# Patient Record
Sex: MALE | Race: WHITE | Employment: UNEMPLOYED | URBAN - METROPOLITAN AREA
[De-identification: names, ages, dates, MRNs, and addresses within clinical notes are randomized per-mention and may not be internally consistent; named-entity substitution may affect disease eponyms.]

---

## 2022-08-31 ENCOUNTER — APPOINTMENT (OUTPATIENT)
Dept: GENERAL RADIOLOGY | Age: 52
End: 2022-08-31
Payer: COMMERCIAL

## 2022-08-31 ENCOUNTER — HOSPITAL ENCOUNTER (EMERGENCY)
Age: 52
Discharge: HOME | End: 2022-08-31
Attending: EMERGENCY MEDICINE
Payer: COMMERCIAL

## 2022-08-31 VITALS
TEMPERATURE: 98.4 F | SYSTOLIC BLOOD PRESSURE: 150 MMHG | HEIGHT: 74 IN | HEART RATE: 78 BPM | WEIGHT: 218 LBS | DIASTOLIC BLOOD PRESSURE: 92 MMHG | BODY MASS INDEX: 27.98 KG/M2 | RESPIRATION RATE: 16 BRPM | OXYGEN SATURATION: 98 %

## 2022-08-31 DIAGNOSIS — Z01.89 ENCOUNTER FOR CARDIOVERSION PROCEDURE: ICD-10-CM

## 2022-08-31 DIAGNOSIS — I48.91 ATRIAL FIBRILLATION WITH RAPID VENTRICULAR RESPONSE (HCC): Primary | ICD-10-CM

## 2022-08-31 LAB
ALBUMIN SERPL-MCNC: 4.1 G/DL (ref 3.5–5.2)
ALP BLD-CCNC: 85 U/L (ref 40–129)
ALT SERPL-CCNC: 19 U/L (ref 0–40)
ANION GAP SERPL CALCULATED.3IONS-SCNC: 9 MMOL/L (ref 7–16)
AST SERPL-CCNC: 26 U/L (ref 0–39)
BASOPHILS ABSOLUTE: 0.07 E9/L (ref 0–0.2)
BASOPHILS RELATIVE PERCENT: 0.7 % (ref 0–2)
BILIRUB SERPL-MCNC: 0.4 MG/DL (ref 0–1.2)
BUN BLDV-MCNC: 10 MG/DL (ref 6–20)
CALCIUM SERPL-MCNC: 8.9 MG/DL (ref 8.6–10.2)
CHLORIDE BLD-SCNC: 106 MMOL/L (ref 98–107)
CO2: 23 MMOL/L (ref 22–29)
CREAT SERPL-MCNC: 0.9 MG/DL (ref 0.7–1.2)
EKG ATRIAL RATE: 234 BPM
EKG Q-T INTERVAL: 366 MS
EKG QRS DURATION: 88 MS
EKG QTC CALCULATION (BAZETT): 467 MS
EKG R AXIS: 24 DEGREES
EKG T AXIS: 41 DEGREES
EKG VENTRICULAR RATE: 98 BPM
EOSINOPHILS ABSOLUTE: 0.03 E9/L (ref 0.05–0.5)
EOSINOPHILS RELATIVE PERCENT: 0.3 % (ref 0–6)
GFR AFRICAN AMERICAN: >60
GFR NON-AFRICAN AMERICAN: >60 ML/MIN/1.73
GLUCOSE BLD-MCNC: 121 MG/DL (ref 74–99)
HCT VFR BLD CALC: 45.7 % (ref 37–54)
HEMOGLOBIN: 16.1 G/DL (ref 12.5–16.5)
IMMATURE GRANULOCYTES #: 0.03 E9/L
IMMATURE GRANULOCYTES %: 0.3 % (ref 0–5)
INR BLD: 1.2
LYMPHOCYTES ABSOLUTE: 0.77 E9/L (ref 1.5–4)
LYMPHOCYTES RELATIVE PERCENT: 8.1 % (ref 20–42)
MCH RBC QN AUTO: 36.3 PG (ref 26–35)
MCHC RBC AUTO-ENTMCNC: 35.2 % (ref 32–34.5)
MCV RBC AUTO: 102.9 FL (ref 80–99.9)
MONOCYTES ABSOLUTE: 0.4 E9/L (ref 0.1–0.95)
MONOCYTES RELATIVE PERCENT: 4.2 % (ref 2–12)
NEUTROPHILS ABSOLUTE: 8.15 E9/L (ref 1.8–7.3)
NEUTROPHILS RELATIVE PERCENT: 86.4 % (ref 43–80)
PDW BLD-RTO: 11.9 FL (ref 11.5–15)
PLATELET # BLD: 272 E9/L (ref 130–450)
PMV BLD AUTO: 8.9 FL (ref 7–12)
POTASSIUM REFLEX MAGNESIUM: 4.7 MMOL/L (ref 3.5–5)
PRO-BNP: 158 PG/ML (ref 0–125)
PROTHROMBIN TIME: 13 SEC (ref 9.3–12.4)
RBC # BLD: 4.44 E12/L (ref 3.8–5.8)
SODIUM BLD-SCNC: 138 MMOL/L (ref 132–146)
TOTAL PROTEIN: 7.2 G/DL (ref 6.4–8.3)
TROPONIN, HIGH SENSITIVITY: 8 NG/L (ref 0–11)
TSH SERPL DL<=0.05 MIU/L-ACNC: 2.49 UIU/ML (ref 0.27–4.2)
WBC # BLD: 9.5 E9/L (ref 4.5–11.5)

## 2022-08-31 PROCEDURE — 99204 OFFICE O/P NEW MOD 45 MIN: CPT | Performed by: INTERNAL MEDICINE

## 2022-08-31 PROCEDURE — 83880 ASSAY OF NATRIURETIC PEPTIDE: CPT

## 2022-08-31 PROCEDURE — 80053 COMPREHEN METABOLIC PANEL: CPT

## 2022-08-31 PROCEDURE — 96375 TX/PRO/DX INJ NEW DRUG ADDON: CPT

## 2022-08-31 PROCEDURE — 96376 TX/PRO/DX INJ SAME DRUG ADON: CPT

## 2022-08-31 PROCEDURE — 96374 THER/PROPH/DIAG INJ IV PUSH: CPT

## 2022-08-31 PROCEDURE — 2500000003 HC RX 250 WO HCPCS

## 2022-08-31 PROCEDURE — 2500000003 HC RX 250 WO HCPCS: Performed by: EMERGENCY MEDICINE

## 2022-08-31 PROCEDURE — 84443 ASSAY THYROID STIM HORMONE: CPT

## 2022-08-31 PROCEDURE — 93005 ELECTROCARDIOGRAM TRACING: CPT | Performed by: EMERGENCY MEDICINE

## 2022-08-31 PROCEDURE — 85025 COMPLETE CBC W/AUTO DIFF WBC: CPT

## 2022-08-31 PROCEDURE — 6360000002 HC RX W HCPCS: Performed by: EMERGENCY MEDICINE

## 2022-08-31 PROCEDURE — 85610 PROTHROMBIN TIME: CPT

## 2022-08-31 PROCEDURE — 84484 ASSAY OF TROPONIN QUANT: CPT

## 2022-08-31 PROCEDURE — 71045 X-RAY EXAM CHEST 1 VIEW: CPT

## 2022-08-31 PROCEDURE — 99285 EMERGENCY DEPT VISIT HI MDM: CPT

## 2022-08-31 PROCEDURE — APPSS60 APP SPLIT SHARED TIME 46-60 MINUTES

## 2022-08-31 RX ORDER — SODIUM CHLORIDE 0.9 % (FLUSH) 0.9 %
SYRINGE (ML) INJECTION
Status: DISCONTINUED
Start: 2022-08-31 | End: 2022-08-31 | Stop reason: HOSPADM

## 2022-08-31 RX ORDER — LOSARTAN POTASSIUM 50 MG/1
50 TABLET ORAL DAILY
COMMUNITY

## 2022-08-31 RX ORDER — DILTIAZEM HYDROCHLORIDE 5 MG/ML
20 INJECTION INTRAVENOUS ONCE
Status: COMPLETED | OUTPATIENT
Start: 2022-08-31 | End: 2022-08-31

## 2022-08-31 RX ORDER — DILTIAZEM HYDROCHLORIDE 5 MG/ML
INJECTION INTRAVENOUS
Status: COMPLETED
Start: 2022-08-31 | End: 2022-08-31

## 2022-08-31 RX ORDER — ETOMIDATE 2 MG/ML
8 INJECTION INTRAVENOUS ONCE
Status: COMPLETED | OUTPATIENT
Start: 2022-08-31 | End: 2022-08-31

## 2022-08-31 RX ORDER — ETOMIDATE 2 MG/ML
10 INJECTION INTRAVENOUS ONCE
Status: COMPLETED | OUTPATIENT
Start: 2022-08-31 | End: 2022-08-31

## 2022-08-31 RX ORDER — ADENOSINE 3 MG/ML
INJECTION, SOLUTION INTRAVENOUS
Status: COMPLETED
Start: 2022-08-31 | End: 2022-08-31

## 2022-08-31 RX ORDER — FENTANYL CITRATE 50 UG/ML
50 INJECTION, SOLUTION INTRAMUSCULAR; INTRAVENOUS ONCE
Status: COMPLETED | OUTPATIENT
Start: 2022-08-31 | End: 2022-08-31

## 2022-08-31 RX ORDER — METOPROLOL SUCCINATE 50 MG/1
50 TABLET, EXTENDED RELEASE ORAL 2 TIMES DAILY
COMMUNITY

## 2022-08-31 RX ORDER — MAGNESIUM SULFATE IN WATER 40 MG/ML
2000 INJECTION, SOLUTION INTRAVENOUS ONCE
Status: DISCONTINUED | OUTPATIENT
Start: 2022-08-31 | End: 2022-08-31

## 2022-08-31 RX ADMIN — DILTIAZEM HYDROCHLORIDE 20 MG: 5 INJECTION INTRAVENOUS at 14:46

## 2022-08-31 RX ADMIN — ETOMIDATE 8 MG: 2 INJECTION, SOLUTION INTRAVENOUS at 15:55

## 2022-08-31 RX ADMIN — ETOMIDATE 10 MG: 2 INJECTION, SOLUTION INTRAVENOUS at 15:47

## 2022-08-31 RX ADMIN — FENTANYL CITRATE 50 MCG: 50 INJECTION, SOLUTION INTRAMUSCULAR; INTRAVENOUS at 15:48

## 2022-08-31 ASSESSMENT — LIFESTYLE VARIABLES
HOW MANY STANDARD DRINKS CONTAINING ALCOHOL DO YOU HAVE ON A TYPICAL DAY: PATIENT DOES NOT DRINK
HOW OFTEN DO YOU HAVE A DRINK CONTAINING ALCOHOL: NEVER

## 2022-08-31 NOTE — ED PROVIDER NOTES
Alexus Cardozo is a 46 y.o. male presenting to the ED for afib, beginning pta ago. The complaint has been intermittent, moderate in severity, and worsened by nothing. 47 yo m who has chronic afib hasnt seen cardiology in several years is on eliquis and metoprolol has no pcp, has been living here for two years but travels out of town a lot for work. Pt was having his wisdowm teeth pulled and pt found to have afib rvr was given adenosine by dentist for hr of 200. Pt states is a social drinking on weekends. Review of Systems:   Pertinent positives and negatives are stated within HPI, all other systems reviewed and are negative.          --------------------------------------------- PAST HISTORY ---------------------------------------------  Past Medical History:  has a past medical history of Atrial fibrillation ([facility] [address] and Hypertension. Past Surgical History:  has a past surgical history that includes Foot surgery. Social History:  reports that he has never smoked. He has never been exposed to tobacco smoke. He has never used smokeless tobacco. He reports current alcohol use. He reports that he does not currently use drugs. Family History: family history includes High Blood Pressure in his father. The patients home medications have been reviewed. Allergies: Patient has no known allergies.     -------------------------------------------------- RESULTS -------------------------------------------------  All laboratory and radiology results have been personally reviewed by myself   LABS:  Results for orders placed or performed during the hospital encounter of 08/31/22   CBC with Auto Differential   Result Value Ref Range    WBC 9.5 4.5 - 11.5 E9/L    RBC 4.44 3.80 - 5.80 E12/L    Hemoglobin 16.1 12.5 - 16.5 g/dL    Hematocrit 45.7 37.0 - 54.0 %    .9 (H) 80.0 - 99.9 fL    MCH 36.3 (H) 26.0 - 35.0 pg    MCHC 35.2 (H) 32.0 - 34.5 %    RDW 11.9 11.5 - 15.0 fL    Platelets 661 099 - 437 E9/L MPV 8.9 7.0 - 12.0 fL    Neutrophils % 86.4 (H) 43.0 - 80.0 %    Immature Granulocytes % 0.3 0.0 - 5.0 %    Lymphocytes % 8.1 (L) 20.0 - 42.0 %    Monocytes % 4.2 2.0 - 12.0 %    Eosinophils % 0.3 0.0 - 6.0 %    Basophils % 0.7 0.0 - 2.0 %    Neutrophils Absolute 8.15 (H) 1.80 - 7.30 E9/L    Immature Granulocytes # 0.03 E9/L    Lymphocytes Absolute 0.77 (L) 1.50 - 4.00 E9/L    Monocytes Absolute 0.40 0.10 - 0.95 E9/L    Eosinophils Absolute 0.03 (L) 0.05 - 0.50 E9/L    Basophils Absolute 0.07 0.00 - 0.20 E9/L   Comprehensive Metabolic Panel w/ Reflex to MG   Result Value Ref Range    Sodium 138 132 - 146 mmol/L    Potassium reflex Magnesium 4.7 3.5 - 5.0 mmol/L    Chloride 106 98 - 107 mmol/L    CO2 23 22 - 29 mmol/L    Anion Gap 9 7 - 16 mmol/L    Glucose 121 (H) 74 - 99 mg/dL    BUN 10 6 - 20 mg/dL    Creatinine 0.9 0.7 - 1.2 mg/dL    GFR Non-African American >60 >=60 mL/min/1.73    GFR African American >60     Calcium 8.9 8.6 - 10.2 mg/dL    Total Protein 7.2 6.4 - 8.3 g/dL    Albumin 4.1 3.5 - 5.2 g/dL    Total Bilirubin 0.4 0.0 - 1.2 mg/dL    Alkaline Phosphatase 85 40 - 129 U/L    ALT 19 0 - 40 U/L    AST 26 0 - 39 U/L   Troponin   Result Value Ref Range    Troponin, High Sensitivity 8 0 - 11 ng/L   Brain Natriuretic Peptide   Result Value Ref Range    Pro- (H) 0 - 125 pg/mL   Protime-INR   Result Value Ref Range    Protime 13.0 (H) 9.3 - 12.4 sec    INR 1.2    TSH   Result Value Ref Range    TSH 2.490 0.270 - 4.200 uIU/mL   EKG 12 Lead   Result Value Ref Range    Ventricular Rate 102 BPM    Atrial Rate 47 BPM    QRS Duration 96 ms    Q-T Interval 348 ms    QTc Calculation (Bazett) 453 ms    R Axis 33 degrees    T Axis 43 degrees   EKG 12 Lead   Result Value Ref Range    Ventricular Rate 148 BPM    Atrial Rate 125 BPM    QRS Duration 94 ms    Q-T Interval 316 ms    QTc Calculation (Bazett) 496 ms    R Axis 31 degrees    T Axis 6 degrees       RADIOLOGY:  Interpreted by Radiologist.  XR CHEST PORTABLE Final Result   No acute process. ------------------------- NURSING NOTES AND VITALS REVIEWED ---------------------------   The nursing notes within the ED encounter and vital signs as below have been reviewed. BP (!) 150/92   Pulse 78   Temp 98.4 °F (36.9 °C) (Oral)   Resp 16   Ht 6' 2\" (1.88 m)   Wt 218 lb (98.9 kg)   SpO2 98%   BMI 27.99 kg/m²   Oxygen Saturation Interpretation: Normal      ---------------------------------------------------PHYSICAL EXAM--------------------------------------  Physical Exam  Constitutional:       General: He is not in acute distress. Appearance: Normal appearance. He is normal weight. He is not ill-appearing, toxic-appearing or diaphoretic. HENT:      Head: Normocephalic and atraumatic. Right Ear: External ear normal.      Left Ear: External ear normal.      Nose: Nose normal.      Mouth/Throat:      Mouth: Mucous membranes are moist.      Pharynx: Oropharynx is clear. Eyes:      Extraocular Movements: Extraocular movements intact. Pupils: Pupils are equal, round, and reactive to light. Cardiovascular:      Rate and Rhythm: Tachycardia present. Rhythm irregular. Pulses: Normal pulses. Heart sounds: Normal heart sounds. Pulmonary:      Effort: Pulmonary effort is normal.      Breath sounds: Normal breath sounds. Abdominal:      General: There is no distension. Palpations: Abdomen is soft. Tenderness: There is no abdominal tenderness. Musculoskeletal:         General: No swelling. Normal range of motion. Cervical back: Normal range of motion and neck supple. Right lower leg: No edema. Left lower leg: No edema. Skin:     General: Skin is warm and dry. Capillary Refill: Capillary refill takes less than 2 seconds. Neurological:      General: No focal deficit present. Mental Status: He is alert and oriented to person, place, and time. Cranial Nerves: No cranial nerve deficit. Sensory: No sensory deficit. Motor: No weakness. Coordination: Coordination normal.   Psychiatric:         Mood and Affect: Mood normal.         Thought Content: Thought content normal.              ------------------------------ ED COURSE/MEDICAL DECISION MAKING----------------------        ED COURSE:  ED Course as of 08/31/22 1711   Wed Aug 31, 2022   1450 Hr now low 100s post cardizem [OLI]   1507 Pre Cardioversion EKG: This EKG is signed and interpreted by me. Rate: 102  Rhythm: Atrial fibrillation  Interpretation: atrial fibrillation (chronic)  Comparison: no previous EKG available  [JR]   1614 Post Cardioversion with 200 J   EKG: This EKG is signed and interpreted by me. Rate: 85  Rhythm: Sinus  Interpretation: no acute changes  Comparison: NSR, Normal rate, No ST segment elevations or depressions. No more afib with rvr when compared to previous EKG, pre-cardioversion [JR]   1656 Reevaluated patient, he was AOx4, following commands, complaining of no chest pain shortness of breath, feeling better ready to go home with spouse driving him [JR]      ED Course User Index  [OLI] Jessica Arellano DO  [JR] Wisam Dial DO     -------------------------------- Conscious Sedation Procedure Note -----------------------------  Patient Name: Sarah Isaac   Medical Record Number: [id number]  Date: 8/31/22   Time: 5:01 PM EDT   Room/Bed: 11/11    Indication: cardioversion  Consent: I have discussed with the patient and/or the patient representative the indication, alternatives, and the possible risks and/or complications of the planned procedure and the anesthesia methods. The patient and/or patient representative appear to understand and agree to proceed. Physician Involvement: The attending physician was present and supervising this procedure.     8/31/22     Time: 1545 (pre-procedure start time)  Pre-Sedation Documentation and Exam: I have personally completed a history, physical exam & review of systems for this patient (see notes). Airway Assessment: normal  Prior History of Anesthesia Complications: none  ASA Classification: Class 1 - A normal healthy patient  Sedation/ Anesthesia Plan: intravenous sedation  Medications Used: etomidate 18mg intravenously  Monitoring and Safety: The patient was placed on a cardiac monitor and vital signs, pulse oximetry and level of consciousness were continuously evaluated throughout the procedure. The patient was closely monitored until recovery from the medications was complete and the patient had returned to baseline status. Respiratory therapy was on standby at all times during the procedure.    ----------------------------------- Post Conscious Sedation Note -----------------------------------    8/31/22     Time: 1614 (post-procedure stop time)  Post-Sedation Vital Signs: Vital signs were reviewed and were stable after the procedure (see flow sheet for vitals)         Post-Sedation Exam: Lungs: clear to auscultation bilaterally without crackles or wheezing and Cardiovascular: normal, regular rate and rhythm, no murmurs rubs or gallops       Complications: none    Electronically Signed by: Ayaz Aldrich DO       PROCEDURE  8/31/22       Time: 1546    CARDIOVERSION  Risks, benefits and alternatives (for applicable procedures below) described. Performed By: EM Attending Physician and EM Resident. Indication: atrial fibrillation. Informed consent: Written consent obtained. The patient was counseled regarding the procedure in person, it's indications, risks, potential complications and alternatives and any questions were answered. Consent was obtained. .  Procedure:  Vagal maneuvers  unsuccesful, and dlilitzem   attempted. Prior to the Procedure, anesthsia given: yes; conscious sedation sheet completed. .  Cardioversion was performed under my order and direction, and was attempted by synchronous mode, 2 times with 200 Joules.     The resultant rhythm was: conversion to normal sinus rhythm. Complications: None. Patient tolerated the procedure well. Cardiology Consult Placed:  Yes, Dr. Chrissy William MD  .        Medical Decision Making:     Upon evaluation patient he was AOx4, cooperative, hemodynamically stable, nonhypoxic on room air. He was found to be in atrial fibrillation with RVR on initial evaluation and initial EKG. He was given diltiazem without success of cardioverting him. Risks/reports/benefits were discussed with the patient with also cardiology discussing cardioversion in the ER. He reported that he wanted to have it done immediately so he can go home and did not want stay in the hospital.  Right consent obtained to do cardioversion in the ER. Conscious sedation with 18 mg of etomidate was used, conversion to normal sinus rhythm achieved after 200 J delivered. He tolerated the procedure well. No complications. Patient was observed for another hour in the ER, he reported that he was feeling better, no chest pain/shortness of breath/palpitations. He reported he wanted to leave immediately with his spouse driving him. Return precautions to the ER given. He also reported he had plenty medications just refilled everything and did not want any prescriptions. He also agreed to follow-up with cardiology Dr. Chrissy William MD. Patient medically clear for discharge home. All questions and concerns answered. Return precautions to the ER given. Patient medically stable at discharge time. Risks and benefits were discussed with patient for All medications dispensed and given in department as well prescriptions prescribed for home, . The patient elected to take the medicine. Pt instructed on warning signs and precautions for medication side effects. The patient was given warning signs for when to seek medical attention. Counseled regarding todays diagnosis, including possible risks and complications,  especially if left uncontrolled.      Counseled regarding the possible side effects, risks, benefits and alternatives to treatment; patient and/or guardian verbalizes understanding, agrees, feels comfortable with and wishes to proceed with treatment plan. Advised patient to call her primary care physician with any new medication issues, and read all Rx info from pharmacy to assure aware of all possible risks and side effects of medication before taking. I did discuss warning signs for when to return to the Emergency Room, and the patient verbalized understanding      Counseling: The emergency provider has spoken with the patient and spouse/SO and discussed todays results, in addition to providing specific details for the plan of care and counseling regarding the diagnosis and prognosis. Questions are answered at this time and they are agreeable with the plan.      --------------------------------- IMPRESSION AND DISPOSITION ---------------------------------    IMPRESSION  1. Atrial fibrillation with rapid ventricular response ([address]    2. Encounter for cardioversion procedure        DISPOSITION  Disposition: Discharge to home  Patient condition is good      NOTE: This report was transcribed using voice recognition software.  Every effort was made to ensure accuracy; however, inadvertent computerized transcription errors may be present       iVckie Cazares DO  Resident  08/31/22 [id number]

## 2022-08-31 NOTE — ED NOTES
Cardioversion started at 1546  Time out, Dr Gallo Lilly, Dr Nati Patel the Pharmacist, Marilyn Watson RN and myself at bedside  medicated with etomidate and fentanyl prior to procedure. 1549 shocked with 100 j, repeat ekg showed Afib, medicated again with etomidate and shocked again at 1557 200j.  1559 pt is A&O x 4 with stable vs. NSR on EKG Will continue to monitor.  Family now at [facility], RN  08/31/22 [address] [place] [address]  08/31/22 4527

## 2022-08-31 NOTE — Clinical Note
Aydee Abdalla was seen and treated in our emergency department on 8/31/2022. He may return to work on 09/02/2022. If you have any questions or concerns, please don't hesitate to call.       Pee Puga, DO

## 2022-08-31 NOTE — CONSULTS
Inpatient Cardiology Consultation      Reason for Consult: A. fib RVR    Consulting Physician: Dr. Justin Treviño     Requesting Physician: Dr. Malaika Owen     Date of Consultation: 8/31/2022    HISTORY OF PRESENT ILLNESS:   Patient is a 63-year-old male who is not known to [address] [place] [address]. Patient follows with a cardiologist in [place]. HPI:  Patient presented to ER 8/31/2022 per EMS from dentist office. The patient was going to have 6 teeth extracted when he was noted to be in A. fib RVR. Per EMS patient was given 6 mg adenosine by dentist with no effect. Patient was given 20 mg IV push diltiazem with mild effect bring heart rate down from 150s to low 100s. Upon assessment 8/31/2022 patient is asymptomatic at this time and is in A. fib heart rate . The patient reports he has had some mild exertional fatigue recently but denies chest pain, SOB, YIN, nausea, diaphoresis, syncope, dizziness, orthopnea, or PND. He reports his only diagnoses are atrial fibrillation and hypertension. He reports he has been cardioverted several times over the years with no avail and has been in permanent A. fib. He is maintained on Eliquis 5 twice daily and Toprol 50 twice daily. He follows with a cardiologist in [place] and is between [facility] and [place] with his job. The patient reports he has been compliant on Eliquis and has not missed any doses in the last 30 days and did not have to hold the Eliquis for the surgery he was supposed to have today. Dr. Justin Treviño spoke with Dr. Malaika Owen about elective cardioversion today and patient is acceptable to this procedure. Plan is for patient to be sedated and cardioverted and discharged at ER physician discretion. He will be advised to follow-up outpatient with Dr. Justin Treviño locally. Most recent VS 98.4, 91 respirations, 137/101, 98% on room air.   Labs: Potassium 4.7, BUN 10, creatinine 0.9, glucose 121, serum calcium 8.9, proBNP 158, troponin 8, albumin 4.1, TSH 2.49, WBC 9.5, H&H 16.1/45.7, platelet 232, INR 1.2  CXR: No acute process. Please note: past medical records were reviewed per electronic medical record (EMR) - see detailed reports under Past Medical/ Surgical History. Past Medical History:    Permanent atrial fibrillation. Patient reports cardioversion several times but cannot remember the last year. He reports he always converts back to A. fib after DCCV. He is maintained on Eliquis and Toprol per cardiology in [place]. Hypertension  Foot surgery      Medications Prior to admit:  Prior to Admission medications    Medication Sig Start Date End Date Taking? Authorizing Provider   Apixaban (ELIQUIS PO) Take 5 mg by mouth in the morning and at bedtime   Yes Historical Provider, MD   metoprolol succinate (TOPROL XL) 50 MG extended release tablet Take 50 mg by mouth 2 times daily   Yes Historical Provider, MD   losartan (COZAAR) 50 MG tablet Take 50 mg by mouth daily   Yes Historical Provider, MD       Current Medications:    Current Facility-Administered Medications: dilTIAZem 100 mg in dextrose 5 % 100 mL infusion ([facility]), 2.5-15 mg/hr, IntraVENous, Continuous  magnesium sulfate 2000 mg in 50 mL IVPB premix, 2,000 mg, IntraVENous, Once  etomidate (AMIDATE) injection 10 mg, 10 mg, IntraVENous, Once  sodium chloride flush 0.9 % injection, , ,     Allergies:  Patient has no known allergies.     Social History:    Social History     Socioeconomic History    Marital status:      Spouse name: Not on file    Number of children: Not on file    Years of education: Not on file    Highest education level: Not on file   Occupational History    Not on file   Tobacco Use    Smoking status: Never     Passive exposure: Never    Smokeless tobacco: Never   Substance and Sexual Activity    Alcohol use: Yes     Comment: social    Drug use: Not Currently    Sexual activity: Not on file   Other Topics Concern    Not on file   Social History Narrative    Not on file     Social Determinants of Health     Financial Resource Strain: Not on file   Food Insecurity: Not on file   Transportation Needs: Not on file   Physical Activity: Not on file   Stress: Not on file   Social Connections: Not on file   Intimate Partner Violence: Not on file   Housing Stability: Not on file       Family History:   Family History   Problem Relation Age of Onset    High Blood Pressure Father          REVIEW OF SYSTEMS:     Constitutional: Denies fevers, chills, night sweats. Exertional fatigue  HEENT: Denies headaches, nose bleeds, and blurred vision,oral pain, abscess or lesion. Musculoskeletal: Denies falls, pain to BLE with ambulation and edema to BLE. Neurological: Denies dizziness and lightheadedness, numbness and tingling  Cardiovascular: Denies chest pain, palpitations, and feelings of heart racing. Respiratory: Denies orthopnea and PND, YIN/SOB. Gastrointestinal: Denies heartburn, nausea/vomiting, diarrhea and constipation, black/bloody, and tarry stools. Genitourinary: Denies dysuria and hematuria  Hematologic: Denies excessive bruising or bleeding  Lymphatic: Denies lumps and bumps to neck, axilla, breast, and groin  Endocrine: Denies excessive thirst. Denies intolerance to hot and cold. Psychiatric: Denies anxiety and depression. PHYSICAL EXAM:   BP (!) 137/101   Pulse 91   Temp 98.4 °F (36.9 °C) (Oral)   Resp 16   Ht 6' 2\" (1.88 m)   Wt 218 lb (98.9 kg)   BMI 27.99 kg/m²   CONST:  Well developed, well nourished 51-year-old  male who appears stated age. Awake, alert, cooperative, no apparent distress  HEENT:   Head- Normocephalic, atraumatic   Eyes- Conjunctivae pink, anicteric  Throat- Oral mucosa pink and moist  Neck-  No stridor, trachea midline, no jugular venous distention. No adenopathy   CHEST: Chest symmetrical and non-tender to palpation.  No accessory muscle use or intercostal retractions  RESPIRATORY: Lung sounds - clear throughout fields   CARDIOVASCULAR:     No carotid bruit  Heart Inspection- shows no noted pulsations  Heart Palpation- no heaves or thrills; PMI is non-displaced   Heart Ausculation- Regular rate and rhythm, no murmur. No s3, s4 or rub   PV: No lower extremity edema. No varicosities. Pedal pulses palpable, no clubbing or cyanosis   ABDOMEN: Soft, non-tender to light palpation. Bowel sounds present. No palpable masses no organomegaly; no abdominal bruit  MS: Good muscle strength and tone. No atrophy or abnormal movements. : Deferred  SKIN: Warm and dry no statis dermatitis or ulcers   NEURO / PSYCH: Oriented to person, place and time. Speech clear and appropriate. Follows all commands. Pleasant affect     DATA:    ECG: Atrial fibrillation with RVR, vent rate 148, QRS duration 94, QTc 496. Tele strips: A. fib RVR 94-1 10  Diagnostic:    Labs:   CBC:   Recent Labs     08/31/22  1505   WBC 9.5   HGB 16.1   HCT 45.7        BMP:   Recent Labs     08/31/22  1505      K 4.7   CO2 23   BUN 10   CREATININE 0.9   LABGLOM >60   CALCIUM 8.9       TSH:   Recent Labs     08/31/22  1505   TSH 2.490       Recent Labs     08/31/22  1505   PROTIME 13.0*   INR 1.2       LIVER PROFILE:  Recent Labs     08/31/22  1505   AST 26   ALT 19   LABALBU 4.1      Latest Reference Range & Units 8/31/22 15:05   Pro-BNP 0 - 125 pg/mL 158 (H)   Troponin, High Sensitivity 0 - 11 ng/L 8   (H): Data is abnormally high    CXR:  Impression   No acute process. Assessment:  A. fib RVR--given 6 adenosine by dentist with no change--rate decreased from 150 to low 100s after 20 mg IVP diltiazem. Hypertension, elevated. Mildly elevated proBNP 158, most likely 2/2 A. fib RVR    Plan:  Emergency medicine to cardiovert patient. If patient is converted to SR and deemed stable by emergency medicine patient may be discharged from cardiology standpoint. Continue current medication regimen of Eliquis, Toprol, losartan.   Patient to follow-up with Dr. Gabriela Amaral to establish local cardiology as patient is between [address] [place] [address]. Rest per emergency medicine. Case and subsequent orders discussed with Dr. Alessandra Lopez. Cardiology to sign off at this time. Please call with any questions/concerns. Electronically signed by MICHELLE Ferreira CNP on 8/31/2022 at 3:43 PM       Addendum:     I personally saw, examined, and evaluated the patient today. I independently performed my own physical examination. I personally reviewed medications, rhythm strips, pertinent labs and reports as available. HPI:  For the full consultation note please refer to the note by Puja Pineda. Briefly, this is a 59-year-old male who has a history of nonobstructive coronary artery disease, paroxysmal atrial fibrillation status post cardioversion as well as ablation in the past, hypertension who follows with a cardiologist in [place]. Although, he has been lost to follow-up for the last few years. He is currently on Eliquis and Toprol-XL along with Cozaar. He presented to the ER complaining of palpitations. This started yesterday. He knew that he was back in atrial fibrillation. EKG reviewed. Shows atrial fibrillation with RVR. Nonspecific ST changes. I was consulted for further recommendations. He states that he has been compliant with his Eliquis and has not missed a single dose in the last month. He did have an ablation in 2017 which was successful for about a year. He had recurrence after that. There were no plans made for a repeat ablation attempt by his cardiologist.  He has had a few episodes of recurrent atrial fibrillation since then needing cardioversion. I have also reviewed the past medical, family, and social history unless otherwise noted. Physical examination:  BP (!) 137/101   Pulse 91   Temp 98.4 °F (36.9 °C) (Oral)   Resp 16   Ht 6' 2\" (1.88 m)   Wt 218 lb (98.9 kg)   BMI 27.99 kg/m²   Constitutional: Oriented to person, place, and time. Well-developed and well-nourished. No distress. Head: Normocephalic and atraumatic. Eyes: EOM are normal.   Neck: Normal range of motion. Neck supple. No hepatojugular reflux and no JVD present. Carotid bruit is not present. No tracheal deviation present. No thyromegaly present. Cardiovascular: Tachycardic with an irregularly irregular rhythm., normal heart sounds and intact distal pulses. Exam reveals no gallop and no friction rub. No murmur heard. Pulmonary/Chest: Effort normal and breath sounds normal. No respiratory distress. No wheezes. No rales. No tenderness. Abdominal: Soft. Bowel sounds are normal. No distension and no mass. No tenderness. No rebound and no guarding. Musculoskeletal: Normal range of motion. No edema and no tenderness. Lymphadenopathy:   No cervical adenopathy. Neurological: Alert and oriented to person, place, and time. Skin: Skin is warm and dry. No rash noted. Not diaphoretic. No erythema. Psychiatric: Normal mood and affect. Behavior is normal.      CBC:         Lab Results   Component Value Date/Time     WBC 9.5 08/31/2022 03:05 PM     RBC 4.44 08/31/2022 03:05 PM     HGB 16.1 08/31/2022 03:05 PM     HCT 45.7 08/31/2022 03:05 PM     .9 08/31/2022 03:05 PM     MCH 36.3 08/31/2022 03:05 PM     MCHC 35.2 08/31/2022 03:05 PM     RDW 11.9 08/31/2022 03:05 PM      08/31/2022 03:05 PM     MPV 8.9 08/31/2022 03:05 PM      BMP: @BRIEFLAB(N  A,K,CL,CO2,BUN,LABALBU,CREATININE,CALCIUM,GFRAA,LABGLOM,AGLUCOSE)@  Magnesium:  No results found for: MG  Cardiac Enzymes: No results found for: CKTOTAL, CKMB, CKMBINDEX, TROPONINI   PT/INR:          Lab Results   Component Value Date/Time     PROTIME 13.0 08/31/2022 03:05 PM     INR 1.2 08/31/2022 03:05 PM      TSH:          Lab Results   Component Value Date/Time     TSH 2.490 08/31/2022 03:05 PM         Rhythm Strip: Reviewed. Shows atrial fibrillation with rates in the 100s to 120s. EKG: Reviewed by me.   Shows atrial fibrillation with RVR. Nonspecific ST changes        I have personally participated in the history, exam, diagnosis with my Advanced Practice Nurse/Physician Assistant on the date of service. I discussed pertinent history, exam findings, and treatment plan with our physician extender. Assessment:  Recurrent paroxysmal atrial fibrillation. Hypertension. History of ablation as well as cardioversion. Recommendations:  He is currently stable. He is symptomatic with his atrial fibrillation. He has been compliant with his Eliquis. He is agreeable to proceeding with a direct-current cardioversion. No NIELS required. This will be performed by the ER using ketamine for moderate sedation. He will continue on his Toprol-XL as well as Cozaar. He wants to follow-up with his cardiologist in [place]. Should cardioversion be successful and patient is able to ambulate without symptoms post cardioversion, he should be stable for discharge with an outpatient follow-up. He is to follow-up with cardiology in 3 to 4 weeks after discharge. He will need an outpatient sleep study evaluation. In the long run, he should be considered for a repeat ablation attempt by electrophysiology. I will defer to his primary cardiologist.     Thank you very much for allowing to participate in the care of patient! Please not hesitate to call me with any questions or concerns. I directly participated in the medical-decision making, ordering tests, and medication adjustments as documented today. I contributed >50% to the overall encounter time including review of testing, formulating a plan with the APC and communication with the other care team members and family. Gladys Tom MD, [facility] Cardiology     NOTE: This report was transcribed using voice recognition software. Every effort was made to ensure accuracy; however, inadvertent computerized transcription errors may be present.

## 2022-08-31 NOTE — CONSULTS
Addendum:    I personally saw, examined, and evaluated the patient today. I independently performed my own physical examination. I personally reviewed medications, rhythm strips, pertinent labs and reports as available. HPI:  For the full consultation note please refer to the note by Puja Keller Briefly, this is a 59-year-old male who has a history of nonobstructive coronary artery disease, paroxysmal atrial fibrillation status post cardioversion as well as ablation in the past, hypertension who follows with a cardiologist in [place]. Although, he has been lost to follow-up for the last few years. He is currently on Eliquis and Toprol-XL along with Cozaar. He presented to the ER complaining of palpitations. This started yesterday. He knew that he was back in atrial fibrillation. EKG reviewed. Shows atrial fibrillation with RVR. Nonspecific ST changes. I was consulted for further recommendations. He states that he has been compliant with his Eliquis and has not missed a single dose in the last month. He did have an ablation in 2017 which was successful for about a year. He had recurrence after that. There were no plans made for a repeat ablation attempt by his cardiologist.  He has had a few episodes of recurrent atrial fibrillation since then needing cardioversion. I have also reviewed the past medical, family, and social history unless otherwise noted. Physical examination:  BP (!) 137/101   Pulse 91   Temp 98.4 °F (36.9 °C) (Oral)   Resp 16   Ht 6' 2\" (1.88 m)   Wt 218 lb (98.9 kg)   BMI 27.99 kg/m²   Constitutional: Oriented to person, place, and time. Well-developed and well-nourished. No distress. Head: Normocephalic and atraumatic. Eyes: EOM are normal.   Neck: Normal range of motion. Neck supple. No hepatojugular reflux and no JVD present. Carotid bruit is not present. No tracheal deviation present. No thyromegaly present.    Cardiovascular: Tachycardic with an irregularly irregular rhythm., normal heart sounds and intact distal pulses. Exam reveals no gallop and no friction rub. No murmur heard. Pulmonary/Chest: Effort normal and breath sounds normal. No respiratory distress. No wheezes. No rales. No tenderness. Abdominal: Soft. Bowel sounds are normal. No distension and no mass. No tenderness. No rebound and no guarding. Musculoskeletal: Normal range of motion. No edema and no tenderness. Lymphadenopathy:   No cervical adenopathy. Neurological: Alert and oriented to person, place, and time. Skin: Skin is warm and dry. No rash noted. Not diaphoretic. No erythema. Psychiatric: Normal mood and affect. Behavior is normal.     CBC:   Lab Results   Component Value Date/Time    WBC 9.5 08/31/2022 03:05 PM    RBC 4.44 08/31/2022 03:05 PM    HGB 16.1 08/31/2022 03:05 PM    HCT 45.7 08/31/2022 03:05 PM    .9 08/31/2022 03:05 PM    MCH 36.3 08/31/2022 03:05 PM    MCHC 35.2 08/31/2022 03:05 PM    RDW 11.9 08/31/2022 03:05 PM     08/31/2022 03:05 PM    MPV 8.9 08/31/2022 03:05 PM     BMP: @BRIEFLAB(N  A,K,CL,CO2,BUN,LABALBU,CREATININE,CALCIUM,GFRAA,LABGLOM,AGLUCOSE)@  Magnesium:  No results found for: MG  Cardiac Enzymes: No results found for: CKTOTAL, CKMB, CKMBINDEX, TROPONINI   PT/INR:    Lab Results   Component Value Date/Time    PROTIME 13.0 08/31/2022 03:05 PM    INR 1.2 08/31/2022 03:05 PM     TSH:    Lab Results   Component Value Date/Time    TSH 2.490 08/31/2022 03:05 PM       Rhythm Strip: Reviewed. Shows atrial fibrillation with rates in the 100s to 120s. EKG: Reviewed by me. Shows atrial fibrillation with RVR. Nonspecific ST changes      I have personally participated in the history, exam, diagnosis with my Advanced Practice Nurse/Physician Assistant on the date of service. I discussed pertinent history, exam findings, and treatment plan with our physician extender.     Assessment:  Recurrent paroxysmal atrial fibrillation. Hypertension. History of ablation as well as cardioversion. Recommendations:  He is currently stable. He is symptomatic with his atrial fibrillation. He has been compliant with his Eliquis. He is agreeable to proceeding with a direct-current cardioversion. No NIELS required. This will be performed by the ER using ketamine for moderate sedation. He will continue on his Toprol-XL as well as Cozaar. He wants to follow-up with his cardiologist in [place]. Should cardioversion be successful and patient is able to ambulate without symptoms post cardioversion, he should be stable for discharge with an outpatient follow-up. He is to follow-up with cardiology in 3 to 4 weeks after discharge. He will need an outpatient sleep study evaluation. In the long run, he should be considered for a repeat ablation attempt by electrophysiology. I will defer to his primary cardiologist.    Thank you very much for allowing to participate in the care of patient! Please not hesitate to call me with any questions or concerns. I directly participated in the medical-decision making, ordering tests, and medication adjustments as documented today. I contributed >50% to the overall encounter time including review of testing, formulating a plan with the APC and communication with the other care team members and family. Marisela Deng MD, [facility] Cardiology     NOTE: This report was transcribed using voice recognition software. Every effort was made to ensure accuracy; however, inadvertent computerized transcription errors may be present.

## 2022-09-01 LAB
EKG ATRIAL RATE: 125 BPM
EKG ATRIAL RATE: 47 BPM
EKG Q-T INTERVAL: 316 MS
EKG Q-T INTERVAL: 348 MS
EKG QRS DURATION: 94 MS
EKG QRS DURATION: 96 MS
EKG QTC CALCULATION (BAZETT): 453 MS
EKG QTC CALCULATION (BAZETT): 496 MS
EKG R AXIS: 31 DEGREES
EKG R AXIS: 33 DEGREES
EKG T AXIS: 43 DEGREES
EKG T AXIS: 6 DEGREES
EKG VENTRICULAR RATE: 102 BPM
EKG VENTRICULAR RATE: 148 BPM